# Patient Record
Sex: FEMALE | Race: WHITE | NOT HISPANIC OR LATINO | URBAN - METROPOLITAN AREA
[De-identification: names, ages, dates, MRNs, and addresses within clinical notes are randomized per-mention and may not be internally consistent; named-entity substitution may affect disease eponyms.]

---

## 2021-08-05 ENCOUNTER — BOTOX (OUTPATIENT)
Dept: URBAN - METROPOLITAN AREA CLINIC 19 | Facility: CLINIC | Age: 31
Setting detail: DERMATOLOGY
End: 2021-08-05

## 2021-08-05 DIAGNOSIS — L30.9 DERMATITIS, UNSPECIFIED: ICD-10-CM

## 2021-08-05 DIAGNOSIS — L57.8 OTHER SKIN CHANGES DUE TO CHRONIC EXPOSURE TO NONIONIZING RADIATION: ICD-10-CM

## 2021-08-10 ENCOUNTER — OTHER (OUTPATIENT)
Dept: URBAN - METROPOLITAN AREA CLINIC 19 | Facility: CLINIC | Age: 31
Setting detail: DERMATOLOGY
End: 2021-08-10

## 2021-08-10 DIAGNOSIS — L57.0 ACTINIC KERATOSIS: ICD-10-CM

## 2021-08-10 PROCEDURE — OTHER HMG - COSMETIC PROCEDURE: OTHER

## 2021-09-14 ENCOUNTER — LASER (OUTPATIENT)
Dept: URBAN - METROPOLITAN AREA CLINIC 19 | Facility: CLINIC | Age: 31
Setting detail: DERMATOLOGY
End: 2021-09-14

## 2021-09-14 PROCEDURE — OTHER GENTLEMAX LASER: OTHER

## 2021-10-19 ENCOUNTER — BOTOX (OUTPATIENT)
Dept: URBAN - METROPOLITAN AREA CLINIC 19 | Facility: CLINIC | Age: 31
Setting detail: DERMATOLOGY
End: 2021-10-19

## 2021-10-19 DIAGNOSIS — L57.0 ACTINIC KERATOSIS: ICD-10-CM

## 2021-10-19 PROCEDURE — OTHER GENTLEMAX LASER: OTHER

## 2023-08-15 ENCOUNTER — OFFICE (OUTPATIENT)
Dept: URBAN - METROPOLITAN AREA CLINIC 67 | Facility: CLINIC | Age: 33
End: 2023-08-15

## 2023-08-15 ENCOUNTER — OFFICE (OUTPATIENT)
Dept: URBAN - METROPOLITAN AREA CLINIC 67 | Facility: CLINIC | Age: 33
End: 2023-08-15
Payer: COMMERCIAL

## 2023-08-15 VITALS — HEIGHT: 67 IN | WEIGHT: 137 LBS

## 2023-08-15 DIAGNOSIS — R74.01 ELEVATION OF LEVELS OF LIVER TRANSAMINASE LEVELS: ICD-10-CM

## 2023-08-15 DIAGNOSIS — R53.82 CHRONIC FATIGUE, UNSPECIFIED: ICD-10-CM

## 2023-08-15 DIAGNOSIS — R76.8 OTHER SPECIFIED ABNORMAL IMMUNOLOGICAL FINDINGS IN SERUM: ICD-10-CM

## 2023-08-15 DIAGNOSIS — R74.8 ABNORMAL LEVELS OF OTHER SERUM ENZYMES: ICD-10-CM

## 2023-08-15 PROCEDURE — 76981 USE PARENCHYMA: CPT

## 2023-08-15 PROCEDURE — 99204 OFFICE O/P NEW MOD 45 MIN: CPT | Mod: 95

## 2023-08-15 NOTE — SERVICENOTES
Family History: Sister- Hemolytic Anemia- Grandmother- Scleroderma
Telehealth Platform Used: [employer]
Location of patient: Home
Location of provider: Office
Other persons participating: None

## 2023-08-15 NOTE — SERVICEHPINOTES
Patient reports via telehealth to discuss elevated liver enzymes and discuss fibroscan results she had done this morning. br8/15/23- fibroscan 200,5.3 (s0,f0)
br
br blood work 2 years ago, LFTs high then, and so US and then normal US, and now recheck 2 years later and continued LFTs elevation. 
br No ETOH, was taking ADHD med but stopped 4 months ago. she reports no jaundice or changes in skin, urine, stools, no itching. She reports more fatigue lately. Strong family history of autoimmune conditions. 
br Sister with hemolytic anemia, paternal grandma is scleroderma, a lot of autoimmune diseases in family. br 
bre She also reports her cat has similar issues since moving from [place] 2 years ago and reports he had a liver bx that showed inflammation and is n ow on prednisone but is having jaundice. she has questions regarding environmental concerns that would cause liver enzyme elevation. br
8/18/21- Abd US- normalbr8/1/23- AST 37, ALT 70, GGT 89, iron 84, 22% sat, ferritin 26, LKM, A1A, AMA ASMA ceruplasmin neg., LUIS 1:40.

## 2024-04-22 ENCOUNTER — OFFICE (OUTPATIENT)
Dept: URBAN - METROPOLITAN AREA CLINIC 67 | Facility: CLINIC | Age: 34
End: 2024-04-22
Payer: COMMERCIAL

## 2024-04-22 VITALS
HEIGHT: 67 IN | OXYGEN SATURATION: 96 % | SYSTOLIC BLOOD PRESSURE: 122 MMHG | WEIGHT: 136 LBS | DIASTOLIC BLOOD PRESSURE: 80 MMHG | HEART RATE: 71 BPM

## 2024-04-22 DIAGNOSIS — R74.8 ABNORMAL LEVELS OF OTHER SERUM ENZYMES: ICD-10-CM

## 2024-04-22 PROCEDURE — 99214 OFFICE O/P EST MOD 30 MIN: CPT | Performed by: INTERNAL MEDICINE

## 2024-04-22 NOTE — SERVICEHPINOTES
Ivy Bingham   is seen today for a follow-up visit regarding a history of elevated enzymes.
br
br She reports a history of elevated liver enzymes dating back to 2021 - her evaluation this far has only been remarkable for a low-titer LUIS in 8/2023 but a repeat LUIS done in 11/2023 was negative. 
br Her most recent liver enzymes done on 4/17/24 were normal with the exception of an ALT 46 - there is no known family history of liver disease and she denies the consumption of alcohol. She has been taking OTC Milk Thistle for the past few weeks but otherwise denies taking any herbal preparations.     
br
br8/15/23- fibroscan 200/5.3 (s0,f0)br8/18/21- Abd US- normalbr8/1/23- AST 37, ALT 70, GGT 89, iron 84, 22% sat, ferritin 26, LKM, A1A, AMA , ASMA ceruplasmin neg. LUIS 1:40. HCV and HBsAg non-reactive in 7/2023. Repeat LUIS negative in 11/2023.

## 2024-04-22 NOTE — SERVICENOTES
Chronic mildly elevated serum aminotransferases with unremarkable evaluation thus far. We discussed that the next step would be a liver biopsy and she is agreeable to this plan. 
Follow-up to be determined based on the results of her biopsy.